# Patient Record
Sex: FEMALE | Race: WHITE | Employment: OTHER | URBAN - METROPOLITAN AREA
[De-identification: names, ages, dates, MRNs, and addresses within clinical notes are randomized per-mention and may not be internally consistent; named-entity substitution may affect disease eponyms.]

---

## 2018-07-26 ENCOUNTER — OFFICE VISIT (OUTPATIENT)
Dept: OBGYN | Facility: CLINIC | Age: 73
End: 2018-07-26
Payer: MEDICARE

## 2018-07-26 ENCOUNTER — RADIANT APPOINTMENT (OUTPATIENT)
Dept: BONE DENSITY | Facility: CLINIC | Age: 73
End: 2018-07-26
Payer: MEDICARE

## 2018-07-26 ENCOUNTER — RADIANT APPOINTMENT (OUTPATIENT)
Dept: MAMMOGRAPHY | Facility: CLINIC | Age: 73
End: 2018-07-26
Payer: MEDICARE

## 2018-07-26 VITALS
HEART RATE: 74 BPM | BODY MASS INDEX: 23.16 KG/M2 | SYSTOLIC BLOOD PRESSURE: 116 MMHG | HEIGHT: 65 IN | DIASTOLIC BLOOD PRESSURE: 68 MMHG | WEIGHT: 139 LBS

## 2018-07-26 DIAGNOSIS — Z78.0 ASYMPTOMATIC POSTMENOPAUSAL STATE: ICD-10-CM

## 2018-07-26 DIAGNOSIS — Z12.31 VISIT FOR SCREENING MAMMOGRAM: ICD-10-CM

## 2018-07-26 DIAGNOSIS — R10.2 VAGINAL PAIN: ICD-10-CM

## 2018-07-26 DIAGNOSIS — Z01.419 ENCOUNTER FOR GYNECOLOGICAL EXAMINATION WITHOUT ABNORMAL FINDING: Primary | ICD-10-CM

## 2018-07-26 DIAGNOSIS — B00.9 HERPES SIMPLEX VIRUS INFECTION: ICD-10-CM

## 2018-07-26 PROCEDURE — 77080 DXA BONE DENSITY AXIAL: CPT | Performed by: OBSTETRICS & GYNECOLOGY

## 2018-07-26 PROCEDURE — 99397 PER PM REEVAL EST PAT 65+ YR: CPT | Performed by: NURSE PRACTITIONER

## 2018-07-26 PROCEDURE — 77063 BREAST TOMOSYNTHESIS BI: CPT | Mod: TC

## 2018-07-26 PROCEDURE — 77067 SCR MAMMO BI INCL CAD: CPT | Mod: TC

## 2018-07-26 RX ORDER — VALACYCLOVIR HYDROCHLORIDE 1 G/1
TABLET, FILM COATED ORAL
Qty: 30 TABLET | Refills: 0 | Status: SHIPPED | OUTPATIENT
Start: 2018-07-26 | End: 2018-07-26

## 2018-07-26 RX ORDER — LIDOCAINE 50 MG/G
OINTMENT TOPICAL PRN
Qty: 5 G | Refills: 3 | Status: SHIPPED | OUTPATIENT
Start: 2018-07-26

## 2018-07-26 RX ORDER — ATORVASTATIN CALCIUM 20 MG/1
TABLET, FILM COATED ORAL
Refills: 1 | COMMUNITY
Start: 2018-05-02

## 2018-07-26 RX ORDER — LIDOCAINE 50 MG/G
OINTMENT TOPICAL PRN
Qty: 5 G | Refills: 3 | Status: SHIPPED | OUTPATIENT
Start: 2018-07-26 | End: 2018-07-26

## 2018-07-26 RX ORDER — METHYLPHENIDATE HYDROCHLORIDE 20 MG/1
TABLET ORAL
Refills: 0 | COMMUNITY
Start: 2018-04-25

## 2018-07-26 RX ORDER — VALACYCLOVIR HYDROCHLORIDE 1 G/1
TABLET, FILM COATED ORAL
Qty: 30 TABLET | Refills: 0 | Status: SHIPPED | OUTPATIENT
Start: 2018-07-26

## 2018-07-26 ASSESSMENT — ANXIETY QUESTIONNAIRES
3. WORRYING TOO MUCH ABOUT DIFFERENT THINGS: NOT AT ALL
GAD7 TOTAL SCORE: 0
2. NOT BEING ABLE TO STOP OR CONTROL WORRYING: NOT AT ALL
6. BECOMING EASILY ANNOYED OR IRRITABLE: NOT AT ALL
IF YOU CHECKED OFF ANY PROBLEMS ON THIS QUESTIONNAIRE, HOW DIFFICULT HAVE THESE PROBLEMS MADE IT FOR YOU TO DO YOUR WORK, TAKE CARE OF THINGS AT HOME, OR GET ALONG WITH OTHER PEOPLE: NOT DIFFICULT AT ALL
5. BEING SO RESTLESS THAT IT IS HARD TO SIT STILL: NOT AT ALL
7. FEELING AFRAID AS IF SOMETHING AWFUL MIGHT HAPPEN: NOT AT ALL
1. FEELING NERVOUS, ANXIOUS, OR ON EDGE: NOT AT ALL

## 2018-07-26 ASSESSMENT — PATIENT HEALTH QUESTIONNAIRE - PHQ9: 5. POOR APPETITE OR OVEREATING: NOT AT ALL

## 2018-07-26 NOTE — PROGRESS NOTES
Alayna is a 72 year old  female who presents for annual exam.     Besides routine health maintenance, she has no other health concerns today .    Do you have a Health Care Directive?: No: Advance care planning was reviewed with patient; patient declined at this time.    Fall risk:        HPI:  The patient's PCP is  Julian Chow MD.  Pt here today for her pelvic and breast exam.     She is moving down to [place] and has been doing a lot more lifting/pushing/pulling. She has had some new left breast pain recently.     She also needs a refill of her valtrex and lidocaine ointment she uses on her old scar vaginally.     GYNECOLOGIC HISTORY:  No LMP recorded. Patient is postmenopausal..   reports that she has never smoked. She has never used smokeless tobacco.    Patient is sexually active.  STD testing offered?  Declined  Last PHQ-9 score on record=   PHQ-9 SCORE 2018   Total Score 0     Last GAD7 score on record=   LIDIA-7 SCORE 9/15/2016 2018   Total Score 0 0     Alcohol Score =     HEALTH MAINTENANCE:  Cholesterol:    Last Mammo: 9/15/16, Result: normal, Next Mammo: today   Pap:5/25/15 NEG, No co testing   DEXA:   with PCP   Colonoscopy:  16, Result:  normal, Next Colonoscopy .    Health maintenance updated:  yes    HISTORY:  Obstetric History       T2      L2     SAB2   TAB0   Ectopic0   Multiple0   Live Births0       # Outcome Date GA Lbr Aldo/2nd Weight Sex Delivery Anes PTL Lv   4 Term            3 Term            2 SAB            1 SAB                 Patient Active Problem List   Diagnosis   (none) - all problems resolved or deleted     Past Surgical History:   Procedure Laterality Date     ARTHRODESIS FOOT Right 10/20/2014    Procedure: ARTHRODESIS FOOT;  Surgeon: Giovany Perez MD;  Location:  [facility]     BIOPSY      study related     BREAST SURGERY  2005    implants     C LAT LUMBAR SPINE FUSION  2014    L4-L5 area.     CERCLAGE CERVICAL        COLONOSCOPY  4/12/16    [facility]: mild diverticulosis w/o diverticulitis     COSMETIC SURGERY  4/2005    tummy tuck     DILATION AND CURETTAGE  1975     EXCISE PILONIDAL CYST, SIMPLE       EYE SURGERY      lasix bilateral     HERNIA REPAIR      umbilical     MINI ARC SLING OPERATION FOR STRESS INCONTINENCE  4/2014    Dr. English at [facility]     ORTHOPEDIC SURGERY  2009    bunionectomy     ORTHOPEDIC SURGERY  2007    thumb implants bilateral     REMOVE HARDWARE FOOT Right 10/20/2014    Procedure: REMOVE HARDWARE FOOT;  Surgeon: Giovany Perez MD;  Location: [facility]     TONSILLECTOMY        Social History   Substance Use Topics     Smoking status: Never Smoker     Smokeless tobacco: Never Used     Alcohol use 0.0 oz/week     0 Standard drinks or equivalent per week      Comment: social      Problem (# of Occurrences) Relation (Name,Age of Onset)    Diabetes (2) Mother, Sister    HEART DISEASE (1) Unspecified    Hyperlipidemia (1) Unspecified            Current Outpatient Prescriptions   Medication Sig     acyclovir (ZOVIRAX) 5 % cream Apply topically 5 times daily     atorvastatin (LIPITOR) 20 MG tablet TAKE 1 TAB BY MOUTH DAILY.     conjugated estrogens (PREMARIN) vaginal cream Place 0.5 g vaginally twice a week     escitalopram (LEXAPRO) 10 MG tablet Take 10 mg by mouth daily.     FLUoxetine (PROZAC) 20 MG capsule TAKE 1 CAPSULE BY MOUTH EVERY MORNING     lidocaine (XYLOCAINE) 5 % ointment Apply topically as needed for moderate pain Apply to affected area on vaginal scar as needed.     methylphenidate (RITALIN) 10 MG tablet TK 1 T PO QPM     methylphenidate (RITALIN) 20 MG tablet TAKE 1 TABLET BY MOUTH EVERY MORNING AND AT NOON AS NEEDED     methylphenidate HCl 20 MG CP24 Take  by mouth.     senna-docusate (SENOKOT-S;PERICOLACE) 8.6-50 MG per tablet Take 1-2 tablets by mouth 2 times daily Take while on oral narcotics to prevent or treat constipation.     TRAZODONE HCL PO Take 25 mg by mouth nightly as  "needed. Indications: Trouble Sleeping     valACYclovir (VALTREX) 1000 mg tablet Take one tab by mouth daily for suppression.     zolpidem (AMBIEN) 10 MG tablet TAKE 1 TABLET BY MOUTH AT BEDTIME AS NEEDED     [DISCONTINUED] valACYclovir (VALTREX) 1000 mg tablet Take one tab by mouth daily for suppression.     [DISCONTINUED] valACYclovir (VALTREX) 1000 mg tablet Take one tab by mouth daily for suppression.     No current facility-administered medications for this visit.        No Known Allergies    Past medical, surgical, social and family history were reviewed and updated in EPIC.    ROS:       EXAM:  /68  Pulse 74  Ht 5' 5\" (1.651 m)  Wt 139 lb (63 kg)  BMI 23.13 kg/m2   BMI: Body mass index is 23.13 kg/(m^2).    EXAM:  Constitutional: Appearance: Well nourished, well developed alert, in no acute distress  Neck:  Lymph Nodes:  No lymphadenopathy present    Thyroid:  Gland size normal, nontender, no nodules or masses present  on palpation  Chest:  Respiratory Effort:  Breathing unlabored  Cardiovascular:Heart    Auscultation:  Regular rate, normal rhythm, no murmurs present  Breasts: Inspection of Breasts:  No lymphadenopathy present., Palpation of Breasts and Axillae:  No masses present on palpation, no breast tenderness., Axillary Lymph Nodes:  No lymphadenopathy present. and No nodularity, asymmetry or nipple discharge bilaterally.  Gastrointestinal:  Abdominal Examination:  Abdomen nontender to palpation, tone normal without     rigidity or guarding, no masses present, umbilicus without lesions    Liver and speen:  No hepatomegaly present, liver nontender to palpation    Hernias:  No hernias present  Lymphatic: Lymph Nodes:  No other lymphadenopathy present  Skin:  General Inspection:  No rashes present, no lesions present, no areas of  discoloration.    Genitalia and Groin:  No rashes present, no lesions present, no areas of  discoloration, no masses present  Neurologic/Psychiatric:    Mental Status:  " Oriented X3     Pelvic Exam:  External Genitalia:     Normal appearance for age, no discharge present, no tenderness present, no inflammatory lesions present, color normal  Vagina:     Normal vaginal vault without central or paravaginal defects, ATROPHIC  Bladder:     Nontender to palpation  Urethra:   Urethral Body:  Urethra palpation normal, urethra structural support normal   Urethral Meatus:  No erythema or lesions present  Cervix:     Appearance healthy, no lesions present, nontender to palpation, no bleeding present  Uterus:     Nontender to palpation, no masses present, position anteflexed, mobility: normal  Adnexa:     No adnexal tenderness present, no adnexal masses present  Perineum:     Perineum within normal limits, no evidence of trauma, no rashes or skin lesions present  Inguinal Lymph Nodes:     No lymphadenopathy present      COUNSELING:   Special attention given to:       Regular exercise       Healthy diet/nutrition    BMI:  Body mass index is 23.13 kg/(m^2).     reports that she has never smoked. She has never used smokeless tobacco.      ASSESSMENT:  72 year old female with satisfactory annual exam.    ICD-10-CM    1. Encounter for gynecological examination without abnormal finding Z01.419    2. Herpes simplex virus infection B00.9 valACYclovir (VALTREX) 1000 mg tablet     DISCONTINUED: valACYclovir (VALTREX) 1000 mg tablet   3. Vaginal pain R10.2 lidocaine (XYLOCAINE) 5 % ointment     DISCONTINUED: lidocaine (XYLOCAINE) 5 % ointment       PLAN:  Healthy normal postmenopausal gyn exam. No pap smear. No longer indicated. rx sent. Mesh intact. No erosion. Left breast pain likely muscular. Will have mammogram today.     REINIER Graves CNP

## 2018-07-26 NOTE — MR AVS SNAPSHOT
"              After Visit Summary   7/26/2018    Alayna Garcia    MRN: [id number]           Patient Information     Date Of Birth          1945        Visit Information        Provider Department      7/26/2018 2:30 PM Olga Hurley APRN CNP [facility]        Today's Diagnoses     Encounter for gynecological examination without abnormal finding    -  1    Herpes simplex virus infection        Vaginal pain           Follow-ups after your visit        Follow-up notes from your care team     Return in about 1 year (around 7/26/2019).      Who to contact     If you have questions or need follow up information about today's clinic visit or your schedule please contact [facility] directly at [phone].  Normal or non-critical lab and imaging results will be communicated to you by [employer], letter or phone within 4 business days after the clinic has received the results. If you do not hear from us within 7 days, please contact the clinic through Re[employer] or phone. If you have a critical or abnormal lab result, we will notify you by phone as soon as possible.  Submit refill requests through [employer] or call your pharmacy and they will forward the refill request to us. Please allow 3 business days for your refill to be completed.          Additional Information About Your Visit        MyChart Information     [employer] gives you secure access to your electronic health record. If you see a primary care provider, you can also send messages to your care team and make appointments. If you have questions, please call your primary care clinic.  If you do not have a primary care provider, please call [phone] and they will assist you.        Care EveryWhere ID     This is your Care EveryWhere ID. This could be used by other organizations to access your [facility] medical records  [id number]        Your Vitals Were     Pulse Height BMI (Body Mass Index)             74 5' 5\" " (1.651 m) 23.13 kg/m2          Blood Pressure from Last 3 Encounters:   07/26/18 116/68   09/15/16 122/82   06/18/15 106/72    Weight from Last 3 Encounters:   07/26/18 139 lb (63 kg)   09/15/16 142 lb (64.4 kg)   06/18/15 146 lb (66.2 kg)              Today, you had the following     No orders found for display         Today's Medication Changes          These changes are accurate as of 7/26/18  3:58 PM.  If you have any questions, ask your nurse or doctor.               Start taking these medicines.        Dose/Directions    lidocaine 5 % ointment   Commonly known as:  XYLOCAINE   Used for:  Vaginal pain   Started by:  Olga Hurley APRN CNP        Apply topically as needed for moderate pain Apply to affected area on vaginal scar as needed.   Quantity:  5 g   Refills:  3       valACYclovir 1000 mg tablet   Commonly known as:  VALTREX   Used for:  Herpes simplex virus infection   Started by:  Olga Hurley APRN CNP        Take one tab by mouth daily for suppression.   Quantity:  30 tablet   Refills:  0            Where to get your medicines      These medications were sent to [facility] [address] [place] [address] [address] [place] [address] & [address] [address]    Hours:  24-hours Phone:  [phone]     lidocaine 5 % ointment    valACYclovir 1000 mg tablet                Primary Care Provider Office Phone # Fax #    Toedward Chow [phone]       [facility] [address] [place] [id number]        Equal Access to Services     [facility] AH: Hadii aad ku hadasho Sorobert, waaxda luqadaha, qaybta kaalmada romeo disla. So [facility] [phone].    ATENCIÓN: Si habla español, tiene a saini disposición servicios gratuitos de asistencia lingüística. Llame al [phone].    We comply with applicable federal civil rights laws and [place] laws. We do not discriminate on the basis of race, color, national  origin, age, disability, sex, sexual orientation, or gender identity.            Thank you!     Thank you for choosing [facility]  for your care. Our goal is always to provide you with excellent care. Hearing back from our patients is one way we can continue to improve our services. Please take a few minutes to complete the written survey that you may receive in the mail after your visit with us. Thank you!             Your Updated Medication List - Protect others around you: Learn how to safely use, store and throw away your medicines at www.disposemymeds.org.          This list is accurate as of 7/26/18  3:58 PM.  Always use your most recent med list.                   Brand Name Dispense Instructions for use Diagnosis    acyclovir 5 % cream    ZOVIRAX     Apply topically 5 times daily        atorvastatin 20 MG tablet    LIPITOR     TAKE 1 TAB BY MOUTH DAILY.        FLUoxetine 20 MG capsule    PROzac     TAKE 1 CAPSULE BY MOUTH EVERY MORNING        LEXAPRO 10 MG tablet   Generic drug:  escitalopram      Take 10 mg by mouth daily.    Cervical stenosis of spine, Lumbar stenosis       lidocaine 5 % ointment    XYLOCAINE    5 g    Apply topically as needed for moderate pain Apply to affected area on vaginal scar as needed.    Vaginal pain       * methylphenidate 20 MG Cp24    RITALIN LA     Take  by mouth.    Cervical stenosis of spine, Lumbar stenosis       * methylphenidate 10 MG tablet    RITALIN     TK 1 T PO QPM        * methylphenidate 20 MG tablet    RITALIN     TAKE 1 TABLET BY MOUTH EVERY MORNING AND AT NOON AS NEEDED        PREMARIN cream   Generic drug:  conjugated estrogens     30 g    Place 0.5 g vaginally twice a week        senna-docusate 8.6-50 MG per tablet    SENOKOT-S;PERICOLACE    30 tablet    Take 1-2 tablets by mouth 2 times daily Take while on oral narcotics to prevent or treat constipation.    Arthritis       TRAZODONE HCL PO      Take 25 mg by mouth nightly as needed.  Indications: Trouble Sleeping        valACYclovir 1000 mg tablet    VALTREX    30 tablet    Take one tab by mouth daily for suppression.    Herpes simplex virus infection       zolpidem 10 MG tablet    AMBIEN     TAKE 1 TABLET BY MOUTH AT BEDTIME AS NEEDED        * Notice:  This list has 3 medication(s) that are the same as other medications prescribed for you. Read the directions carefully, and ask your doctor or other care provider to review them with you.

## 2018-07-27 ASSESSMENT — PATIENT HEALTH QUESTIONNAIRE - PHQ9: SUM OF ALL RESPONSES TO PHQ QUESTIONS 1-9: 0

## 2018-07-27 ASSESSMENT — ANXIETY QUESTIONNAIRES: GAD7 TOTAL SCORE: 0

## 2019-10-03 ENCOUNTER — HEALTH MAINTENANCE LETTER (OUTPATIENT)
Age: 74
End: 2019-10-03

## 2020-02-08 ENCOUNTER — HEALTH MAINTENANCE LETTER (OUTPATIENT)
Age: 75
End: 2020-02-08

## 2021-01-15 ENCOUNTER — HEALTH MAINTENANCE LETTER (OUTPATIENT)
Age: 76
End: 2021-01-15

## 2021-03-21 ENCOUNTER — HEALTH MAINTENANCE LETTER (OUTPATIENT)
Age: 76
End: 2021-03-21

## 2021-09-05 ENCOUNTER — HEALTH MAINTENANCE LETTER (OUTPATIENT)
Age: 76
End: 2021-09-05

## 2022-04-17 ENCOUNTER — HEALTH MAINTENANCE LETTER (OUTPATIENT)
Age: 77
End: 2022-04-17

## 2022-10-23 ENCOUNTER — HEALTH MAINTENANCE LETTER (OUTPATIENT)
Age: 77
End: 2022-10-23

## 2023-06-01 ENCOUNTER — HEALTH MAINTENANCE LETTER (OUTPATIENT)
Age: 78
End: 2023-06-01